# Patient Record
Sex: FEMALE
[De-identification: names, ages, dates, MRNs, and addresses within clinical notes are randomized per-mention and may not be internally consistent; named-entity substitution may affect disease eponyms.]

---

## 2020-01-13 PROBLEM — Z00.00 ENCOUNTER FOR PREVENTIVE HEALTH EXAMINATION: Status: ACTIVE | Noted: 2020-01-13

## 2020-01-22 ENCOUNTER — APPOINTMENT (OUTPATIENT)
Dept: VASCULAR SURGERY | Facility: CLINIC | Age: 40
End: 2020-01-22
Payer: COMMERCIAL

## 2020-01-22 VITALS
TEMPERATURE: 97.9 F | HEART RATE: 82 BPM | HEIGHT: 68 IN | OXYGEN SATURATION: 96 % | SYSTOLIC BLOOD PRESSURE: 107 MMHG | BODY MASS INDEX: 33.04 KG/M2 | WEIGHT: 218 LBS | DIASTOLIC BLOOD PRESSURE: 72 MMHG

## 2020-01-22 DIAGNOSIS — Z82.49 FAMILY HISTORY OF ISCHEMIC HEART DISEASE AND OTHER DISEASES OF THE CIRCULATORY SYSTEM: ICD-10-CM

## 2020-01-22 DIAGNOSIS — R25.2 CRAMP AND SPASM: ICD-10-CM

## 2020-01-22 DIAGNOSIS — Z87.39 PERSONAL HISTORY OF OTHER DISEASES OF THE MUSCULOSKELETAL SYSTEM AND CONNECTIVE TISSUE: ICD-10-CM

## 2020-01-22 DIAGNOSIS — Z86.39 PERSONAL HISTORY OF OTHER ENDOCRINE, NUTRITIONAL AND METABOLIC DISEASE: ICD-10-CM

## 2020-01-22 DIAGNOSIS — I83.893 VARICOSE VEINS OF BILATERAL LOWER EXTREMITIES WITH OTHER COMPLICATIONS: ICD-10-CM

## 2020-01-22 PROCEDURE — 99243 OFF/OP CNSLTJ NEW/EST LOW 30: CPT | Mod: 25

## 2020-01-22 PROCEDURE — 93970 EXTREMITY STUDY: CPT

## 2020-01-22 RX ORDER — NORETHINDRONE ACETATE AND ETHINYL ESTRADIOL 1; 20 MG/1; UG/1
TABLET ORAL
Refills: 0 | Status: ACTIVE | COMMUNITY

## 2020-01-22 NOTE — PHYSICAL EXAM
[JVD] : no jugular venous distention  [2+] : left 2+ [Ankle Swelling (On Exam)] : not present [Ankle Swelling Bilaterally] : bilaterally  [Varicose Veins Of Lower Extremities] : bilaterally [Ankle Swelling On The Left] : moderate [] : bilaterally [No Rash or Lesion] : No rash or lesion [Purpura] : no purpura  [Petechiae] : no petechiae [Skin Ulcer] : no ulcer [Skin Induration] : no induration [Alert] : alert [Oriented to Person] : oriented to person [Oriented to Time] : oriented to time [Oriented to Place] : oriented to place [Calm] : calm [de-identified] : wnl [de-identified] : wdwn nad [de-identified] : meseretl [FreeTextEntry1] : b/l leg small superficial diffuse branch varicose veins - no venous dermatitis, no large ropey varicose veins [de-identified] : wnl [de-identified] : as above

## 2020-01-22 NOTE — REASON FOR VISIT
[Initial Evaluation] : an initial evaluation [FreeTextEntry1] : She was referred by her PMD, Dr. Abraham Gardiner, and her dermatologist for evaluation of small varicose veins both legs.  Patient with c/o bilateral leg 'tiredness and achy' after ambulation.  She does not wear compression stockings, no history of venous treatment and ++ significant family history of varicose veins - mother and father.  B/L VLE today confirms no dvt, no svt and no venous reflux bilateral leg.  She has superficial branch varicose veins bilateral leg.  Recommend thigh high medical grade 20-30 mmHg compression stockings to be worn daily and during flights.  Provided patient with prescription.  She would benefit from bilateral leg sclerotherapy.  Ms. Parks with call the office if she is interested in sclerotherapy treatment.

## 2020-01-22 NOTE — ASSESSMENT
[Arterial/Venous Disease] : arterial/venous disease [Other: _____] : [unfilled] [FreeTextEntry1] : Injection sclerotherapy bilateral lower extremities \par \par Doctor’s office procedure\par \par Ms. RAJ PASCAL has been seen by me for vascular consultation and evaluation of painful small branch varicose veins.  This patient has attempted conservative measures with support hose and leg elevation and has had no satisfactory results.  The patient has intact pulses with no evidence of arterial disease.  Patient is complaining of pain, swelling, itching, stabbing, burning, tenderness and cramping which is interfering with daily duties and activities.  \par \par Reviewed with patient that sclerotherapy is the injection of a sterile agent (polidocanol) into the small branch varicose veins.  It works by damaging the inner wall of the vein.  Eventually, the vein collapses on itself and over time, the damaged vein is replaced with fibrous connective tissue and prevents blood flow through the vessel. Sclerotherapy does not prevent the development of new veins.  Before the treatment, photos may be obtained.   \par \par Dr. Rain explained the risks and benefits of sclerotherapy.  Patient should not receive polidocanol if they have a known clotting disorder, have a known allergy to polidocanol, are pregnant or nursing.  Individual results may vary, and the patient may require multiple injection sessions for optimal treatment success.  Each session consists of total dose of 4 mL, 0.5% sclerosing agent (polidocanol) injected, using a very fine needle (30 gauge), into the veins of the treated leg.  Slight stinging may be experienced with each injection.  The skin may look irritated after the treatment and/or bruising may be noted at injection sites.  Irritation and bruising will resolve with time.   One leg is treated at a time and if the patient needs to return for multiple sessions, the patient may return every 2 weeks for additional sessions.  Patient instructed to wear compression stocking continuously for 72 hours following the treatment and may go about their normal activities after the session.  \par \par

## 2020-01-22 NOTE — PROCEDURE
[FreeTextEntry1] : Rx given for thigh high support hose 20-30 mmHg = pt to consider b/l sclerotherapy

## 2020-01-22 NOTE — ADDENDUM
[FreeTextEntry1] : CEAP Classification:\par []     C0----No visible or palpable signs of venous disease\par x   C1----Telangiectasia or reticular veins\par []     C2----Varicose veins; distinguished from reticular veins by a diameter of 3mm or more.\par []     C3----Edema\par []     I4f--Mybdbol in skin and subcutaneous tissues secondary to CVD---Pigmentation or eczema\par []     W3z--Szvsqej in skin and subcutaneous tissues secondary to CVD---Lipodermatosclerosis or atrophic hari\par []     C5----Healed venous ulcer\par []     C6----Active venous ulcer\par x     S ----Symptoms including ache, pain, tightness, skin irritation, heaviness, muscle cramps, and other venous dysfunction\par []     A ----Asymptomatic\par CEAP Sclore  =  C1, S\par \par \par Attribute                            Absent=0                    Mild=1                                   Moderate=2                                           Severe=3\par 1         Pain                          None                          Occasional                           Daily, moderate                                      Daily, severe\par          \par  1       Varicose Veins         None                          Few: branch VV                  Multiple: GS VV                                      Extensive: GS & LS   \par \par 0        Venous Edema         None                         Evening ankle only                Afternoon above ankle                          Morning above ankle\par \par 0        Skin Pigmentation      None or low             Diffuse, limited, old brown    Diffuse, lower 1/3, recent pigment        Above lower 1/3, and recent pigment\par \par 0        Inflammation              None                        Mild cellulitis                           Moderate cellulitis, lower 1/3                 Severe cellulitis, lower 1/3 & above\par \par 0        Induration                  None                        Focal (<5 cm)                        Medial or lateral, < lower 1/3                  Entire lower 1/3   \par \par 0        No.of active ulcers   0                              1                                             2                                                             >2\par \par 0        Active ulceration      None                       < 3 months                             > 3 months, < 1 year                              Not healed > 1 year\par \par  0       Active ulcer, size     None                       < 2 cm diameter                     2-6 cm diameter                                      > 6 cm diameter      \par \par 0        Compressive tx        No use/compliant    Intermittent use                      Wears most days                                   Full compliance\par \par \par 2      TOTAL (max 30 pts) Venous Clinical Severity Score\par